# Patient Record
Sex: FEMALE | Race: WHITE | NOT HISPANIC OR LATINO | Employment: STUDENT | URBAN - METROPOLITAN AREA
[De-identification: names, ages, dates, MRNs, and addresses within clinical notes are randomized per-mention and may not be internally consistent; named-entity substitution may affect disease eponyms.]

---
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
## 2023-01-11 NOTE — PROGRESS NOTES
[facility]        NAME: Yang Fink is a 17 y o  female  : 2000    MRN: [id number]  DATE: 2018  TIME: 11:52 AM    Assessment and Plan   Strep pharyngitis [J02 0]  1  Strep pharyngitis  amoxicillin-clavulanate (AUGMENTIN) 875-125 mg per tablet    CANCELED: POCT rapid strepA         Patient Instructions     Warm salt water gargles  Encourage fluids and hydration  Follow up with PCP in 3-5 days  Proceed to  ER if symptoms worsen  Chief Complaint     Chief Complaint   Patient presents with    Sore Throat     started yesterda         History of Present Illness       17 y o  Female with pmhx of tonsillitis presents with sore throat and fever x 2 days  Pt states she chronically gets strep throat  C/o right sided cervical adenopathy  Denies body aches, n/v, abdominal pain, rash  Review of Systems   Review of Systems   Constitutional: Positive for fever  Negative for chills and fatigue  HENT: Positive for sore throat  Negative for congestion, ear pain, sinus pain and trouble swallowing  Eyes: Negative for pain, discharge and redness  Respiratory: Negative for cough, chest tightness, shortness of breath and wheezing  Cardiovascular: Negative for chest pain, palpitations and leg swelling  Gastrointestinal: Negative for abdominal pain, diarrhea, nausea and vomiting  Musculoskeletal: Negative for arthralgias, joint swelling and myalgias  Skin: Negative for rash  Neurological: Negative for dizziness, weakness, numbness and headaches           Current Medications       Current Outpatient Prescriptions:     amoxicillin-clavulanate (AUGMENTIN) 875-125 mg per tablet, Take 1 tablet by mouth every 12 (twelve) hours for 10 days, Disp: 20 tablet, Rfl: 0    Current Allergies     Allergies as of 10/26/2018    (No Known Allergies)            The following portions of the patient's history were reviewed and updated as appropriate: allergies, current medications, past family Patient with appointment today.  Jayla Rivera MD    history, past medical history, past social history, past surgical history and problem list      Past Medical History:   Diagnosis Date    Allergic     Murmur, heart        History reviewed  No pertinent surgical history  No family history on file  Medications have been verified  Objective   BP (!) 112/68   Temp 99 4 °F (37 4 °C) (Tympanic)   Resp 18   Ht 5' 6" (1 676 m)   Wt 50 8 kg (112 lb)   LMP 10/04/2018 (Exact Date)   SpO2 98%   BMI 18 08 kg/m²        Physical Exam     Physical Exam   Constitutional: She is oriented to person, place, and time  She appears well-developed and well-nourished  No distress  HENT:   Head: Normocephalic  Right Ear: Tympanic membrane and external ear normal    Left Ear: Tympanic membrane and external ear normal    Nose: Nose normal    Mouth/Throat: Uvula is midline  Oropharyngeal exudate, posterior oropharyngeal edema and posterior oropharyngeal erythema present  Eyes: Pupils are equal, round, and reactive to light  Conjunctivae and EOM are normal    Neck: Normal range of motion  Neck supple  Cardiovascular: Normal rate, regular rhythm and normal heart sounds  No murmur heard  Pulmonary/Chest: Effort normal and breath sounds normal  No respiratory distress  She has no wheezes  Abdominal: Soft  Bowel sounds are normal  There is no tenderness  Musculoskeletal: Normal range of motion  Lymphadenopathy:     She has no cervical adenopathy  Neurological: She is alert and oriented to person, place, and time  She has normal reflexes  Skin: Skin is warm and dry  Psychiatric: She has a normal mood and affect  Nursing note and vitals reviewed